# Patient Record
Sex: FEMALE | URBAN - METROPOLITAN AREA
[De-identification: names, ages, dates, MRNs, and addresses within clinical notes are randomized per-mention and may not be internally consistent; named-entity substitution may affect disease eponyms.]

---

## 2018-05-30 ENCOUNTER — TRANSFERRED RECORDS (OUTPATIENT)
Dept: HEALTH INFORMATION MANAGEMENT | Facility: CLINIC | Age: 73
End: 2018-05-30

## 2018-05-31 ENCOUNTER — TRANSFERRED RECORDS (OUTPATIENT)
Dept: HEALTH INFORMATION MANAGEMENT | Facility: CLINIC | Age: 73
End: 2018-05-31

## 2018-06-01 ENCOUNTER — TRANSFERRED RECORDS (OUTPATIENT)
Dept: HEALTH INFORMATION MANAGEMENT | Facility: CLINIC | Age: 73
End: 2018-06-01

## 2018-06-25 ENCOUNTER — TRANSFERRED RECORDS (OUTPATIENT)
Dept: HEALTH INFORMATION MANAGEMENT | Facility: CLINIC | Age: 73
End: 2018-06-25

## 2018-07-09 ASSESSMENT — ENCOUNTER SYMPTOMS
SEIZURES: 0
WEIGHT GAIN: 0
FATIGUE: 1
POLYPHAGIA: 0
HEADACHES: 0
PARALYSIS: 0
FEVER: 0
LOSS OF CONSCIOUSNESS: 0
DECREASED APPETITE: 0
TINGLING: 0
WEAKNESS: 0
DISTURBANCES IN COORDINATION: 0
HALLUCINATIONS: 0
WEIGHT LOSS: 0
DIZZINESS: 1
MEMORY LOSS: 0
NUMBNESS: 0
ALTERED TEMPERATURE REGULATION: 0
NIGHT SWEATS: 0
POLYDIPSIA: 0
CHILLS: 0
SPEECH CHANGE: 0
INCREASED ENERGY: 1
TREMORS: 0

## 2018-07-12 ENCOUNTER — OFFICE VISIT (OUTPATIENT)
Dept: NEUROSURGERY | Facility: CLINIC | Age: 73
End: 2018-07-12
Payer: MEDICARE

## 2018-07-12 VITALS
BODY MASS INDEX: 19.07 KG/M2 | WEIGHT: 107.6 LBS | HEIGHT: 63 IN | DIASTOLIC BLOOD PRESSURE: 80 MMHG | HEART RATE: 73 BPM | SYSTOLIC BLOOD PRESSURE: 136 MMHG | OXYGEN SATURATION: 100 % | TEMPERATURE: 96 F | RESPIRATION RATE: 17 BRPM

## 2018-07-12 DIAGNOSIS — G91.9 HYDROCEPHALUS, ACQUIRED (H): Primary | ICD-10-CM

## 2018-07-12 PROBLEM — R41.0 CONFUSION: Status: ACTIVE | Noted: 2018-05-31

## 2018-07-12 PROBLEM — G93.89 BRAIN MASS: Status: ACTIVE | Noted: 2018-05-31

## 2018-07-12 PROBLEM — M67.912 TENDINOPATHY OF LEFT ROTATOR CUFF: Status: ACTIVE | Noted: 2017-05-30

## 2018-07-12 PROBLEM — M19.012 PRIMARY OSTEOARTHRITIS OF LEFT SHOULDER: Status: ACTIVE | Noted: 2017-05-30

## 2018-07-12 RX ORDER — ACETAZOLAMIDE 125 MG/1
125 TABLET ORAL DAILY
COMMUNITY
Start: 2018-06-05

## 2018-07-12 RX ORDER — MULTIVIT WITH MINERALS/LUTEIN
TABLET ORAL DAILY
COMMUNITY

## 2018-07-12 ASSESSMENT — PAIN SCALES - GENERAL: PAINLEVEL: MILD PAIN (2)

## 2018-07-12 NOTE — LETTER
7/12/2018       RE: Kiana Hernandez  [address] [place] [address]     Dear Colleague,    Thank you for referring your patient, Kiana Hernandez, to the [facility]. Please see a copy of my visit note below.    See dictated note.    Again, thank you for allowing me to participate in the care of your patient.      Sincerely,    Cheri Dave MD

## 2018-07-12 NOTE — MR AVS SNAPSHOT
"              After Visit Summary   7/12/2018    Kiana Hernandez    MRN: [id number]           Patient Information     Date Of Birth          1945        Visit Information        Provider Department      7/12/2018 8:30 AM Cheri Dave MD [facility]        Care Instructions    Feel free to follow up with Dr. Dave as needed.          Follow-ups after your visit        Who to contact     Please call your clinic at [phone] to:    Ask questions about your health    Make or cancel appointments    Discuss your medicines    Learn about your test results    Speak to your doctor            Additional Information About Your Visit        MyChart Information     [employer] gives you secure access to your electronic health record. If you see a primary care provider, you can also send messages to your care team and make appointments. If you have questions, please call your primary care clinic.  If you do not have a primary care provider, please call [phone] and they will assist you.      [employer] is an electronic gateway that provides easy, online access to your medical records. With [employer], you can request a clinic appointment, read your test results, renew a prescription or communicate with your care team.     To access your existing account, please contact your [facility] or call [phone] for assistance.        Care EveryWhere ID     This is your Care EveryWhere ID. This could be used by other organizations to access your [facility] medical records  [id number]        Your Vitals Were     Pulse Temperature Respirations Height Pulse Oximetry Breastfeeding?    73 96  F (35.6  C) (Oral) 17 1.603 m (5' 3.1\") 100% No    BMI (Body Mass Index)                   19 kg/m2            Blood Pressure from Last 3 Encounters:   07/12/18 136/80    Weight from Last 3 Encounters:   07/12/18 48.8 kg (107 lb 9.6 oz)              Today, you had the following     No orders " found for display       Primary Care Provider    None Specified       No primary provider on file.        Equal Access to Services     LUKE RACHEL : Hadii alirio Mccray, alexandria byrd, gabepuja rushingmaann tanbubbaayde myers. [employer] [place] [phone].    ATENCIÓN: Si habla español, tiene a stout disposición servicios gratuitos de asistencia lingüística. Llame al [phone].    We comply with applicable federal civil rights laws and [place] laws. We do not discriminate on the basis of race, color, national origin, age, disability, sex, sexual orientation, or gender identity.            Thank you!     Thank you for choosing [facility] NEUROSURGERY  for your care. Our goal is always to provide you with excellent care. Hearing back from our patients is one way we can continue to improve our services. Please take a few minutes to complete the written survey that you may receive in the mail after your visit with us. Thank you!             Your Updated Medication List - Protect others around you: Learn how to safely use, store and throw away your medicines at www.disposemymeds.org.          This list is accurate as of 7/12/18 10:18 AM.  Always use your most recent med list.                   Brand Name Dispense Instructions for use Diagnosis    acetaZOLAMIDE 125 MG tablet    DIAMOX     Take 125 mg by mouth daily        vitamin E 1000 UNIT capsule    TOCOPHEROL     Take by mouth daily

## 2018-07-12 NOTE — LETTER
Date:August 14, 2018      Patient was self referred, no letter generated. Do not send.        [facility] Health Information

## 2018-07-12 NOTE — NURSING NOTE
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     David Mcfarlane, EMT

## 2018-07-12 NOTE — LETTER
"7/12/2018      RE: Kiana Hernandez  [address] [place] [address]       See dictated note.    Service Date: 07/12/2018         RE:  Kiana Hernandez.      Dear Dr. Jane:        We saw [facility]. 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  She is accompanied by her wife.  In summary, she is a 73-year-old right-handed woman whose history began at the end of 05/2018 when she had a sudden episode of \"confusion\" while at lunch.  She feels like she was in a dream state.  She was able to drive home.  Her wife was concerned when she began repeating herself multiple times and she was taken to  later that day.  By the time she arrived, her memory improved and she started feeling back to normal.  She was evaluated for stroke.  Workup included an MRI, MRA and MRV of the head.  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  Dr. Landis evaluated and has recommended a third ventriculostomy for management of hydrocephalus.      While she is currently feeling well, she has had some new symptoms over the past few weeks.  She feels imbalanced and has been tripping frequently.  She has fallen twice.  While she has been clumsy most of her life, she acknowledges that the recent episodes are not normal for her.  She describes occasional nausea but no vomiting.  Her appetite has declined, and she has lost 6-8 pounds.  She denies any headaches.  Her cognition appears to be a normal since the initial episode of confusion.  She also describes some fatigue and takes naps every afternoon.  A prescription for acetazolamide was given of which she has not taken the dose of 125 mg daily.      PAST MEDICAL HISTORY:  Relatively brief.  She was hospitalized for chest pain in the past.  She has had no surgeries.        FAMILY HISTORY:  Significant for several relatives of dementia.  Her sister was recently " diagnosed with ALS.  There is no known family history of intracranial tumors.      SOCIAL HISTORY:  She lives in [place].  She is .  She has 2 children.  She works part-time as a spiritual director.  She does not smoke.  On average she drinks a beer a day.      PHYSICAL EXAMINATION:  On exam, her general appearance is very good.  She appears her stated age.  She is alert and fully oriented.  Her speech and language are normal.  Extraocular movements are full.  There is no ptosis or lid lag.  Her pupils are equal and reactive to light and accommodation.  There is no nystagmus.  She moves all extremities with good strength.  Her gait is normal, but we did not test tandem gait.      REVIEW OF STUDIES:  We went over her MRIs from [place].  These show a nonenhancing mass that is a little over 1 cm in the mid-brain tectum.  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      IMPRESSION AND PLAN:  We agree with Dr. Landis's plan of the third ventriculostomy is very reasonable, and we encouraged her to undergo this operation with him.  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  No biopsy or resection is necessary and serial follow up with MRI will be sufficient.  We discussed the technical details of the third ventriculostomy and the potential that she may still need a CSF shunt if her hydrocephalus persists following the third ventriculostomy.      Currently, she is minimally symptomatic.  She has a  trip planned in a few weeks.  As long as her symptoms are minimal, she can hold off on surgery for a few more weeks.  We encouraged her to take her records with her in case she has any problems.  We encouraged her to follow up with Dr. Landis upon her return.  She can contact us if she has any further questions or if  she would like to follow up with us in the future.  Please do not hesitate to contact us with questions.         D: 2018   T: 2018   MT: MARYAM      Name:     AYLEEN CLEMENT   MRN:      [id number]        Account:      [id number]   :      1945           Service Date: 2018      Document: [id number]       Cheri Dave MD    CC:  Alysha Jane MD   [place] [phone]

## 2018-08-13 NOTE — PROGRESS NOTES
"Service Date: 07/12/2018      Alysha Jane MD   [facility]    [address] [place]  [address]      RE:  Kiana Hernandez.      Dear Dr. Jane:        We saw [facility]. 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  She is accompanied by her wife.  In summary, she is a 73-year-old right-handed woman whose history began at the end of 05/2018 when she had a sudden episode of \"confusion\" while at lunch.  She feels like she was in a dream state.  She was able to drive home.  Her wife was concerned when she began repeating herself multiple times and she was taken to [facility] later that day.  By the time she arrived, her memory improved and she started feeling back to normal.  She was evaluated for stroke.  Workup included an MRI, MRA and MRV of the head.  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  Dr. Landis evaluated and has recommended a third ventriculostomy for management of hydrocephalus.      While she is currently feeling well, she has had some new symptoms over the past few weeks.  She feels imbalanced and has been tripping frequently.  She has fallen twice.  While she has been clumsy most of her life, she acknowledges that the recent episodes are not normal for her.  She describes occasional nausea but no vomiting.  Her appetite has declined, and she has lost 6-8 pounds.  She denies any headaches.  Her cognition appears to be a normal since the initial episode of confusion.  She also describes some fatigue and takes naps every afternoon.  A prescription for acetazolamide was given of which she has not taken the dose of 125 mg daily.      PAST MEDICAL HISTORY:  Relatively brief.  She was hospitalized for chest pain in the past.  She has had no surgeries.        FAMILY HISTORY:  Significant for several relatives of dementia.  Her sister was recently diagnosed " with ALS.  There is no known family history of intracranial tumors.      SOCIAL HISTORY:  She lives in [place].  She is .  She has 2 children.  She works part-time as a spiritual director.  She does not smoke.  On average she drinks a beer a day.      PHYSICAL EXAMINATION:  On exam, her general appearance is very good.  She appears her stated age.  She is alert and fully oriented.  Her speech and language are normal.  Extraocular movements are full.  There is no ptosis or lid lag.  Her pupils are equal and reactive to light and accommodation.  There is no nystagmus.  She moves all extremities with good strength.  Her gait is normal, but we did not test tandem gait.      REVIEW OF STUDIES:  We went over her MRIs from [facility].  These show a nonenhancing mass that is a little over 1 cm in the mid-brain tectum.  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      IMPRESSION AND PLAN:  We agree with Dr. Landis's plan of the third ventriculostomy is very reasonable, and we encouraged her to undergo this operation with him.  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  No biopsy or resection is necessary and serial follow up with MRI will be sufficient.  We discussed the technical details of the third ventriculostomy and the potential that she may still need a CSF shunt if her hydrocephalus persists following the third ventriculostomy.      Currently, she is minimally symptomatic.  She has a  trip planned in a few weeks.  As long as her symptoms are minimal, she can hold off on surgery for a few more weeks.  We encouraged her to take her records with her in case she has any problems.  We encouraged her to follow up with Dr. Landis upon her return.  She can contact us if she has any further questions or if she would  like to follow up with us in the future.  Please do not hesitate to contact us with questions.         DIANELYS CORDERO MD             D: 2018   T: 2018   MT: MARYAM      Name:     AYLEEN CLEMENT   MRN:      [id number]        Account:      [id number]   :      1945           Service Date: 2018      Document: [id number]

## 2020-03-11 ENCOUNTER — HEALTH MAINTENANCE LETTER (OUTPATIENT)
Age: 75
End: 2020-03-11

## 2021-01-03 ENCOUNTER — HEALTH MAINTENANCE LETTER (OUTPATIENT)
Age: 76
End: 2021-01-03

## 2021-04-25 ENCOUNTER — HEALTH MAINTENANCE LETTER (OUTPATIENT)
Age: 76
End: 2021-04-25

## 2021-10-10 ENCOUNTER — HEALTH MAINTENANCE LETTER (OUTPATIENT)
Age: 76
End: 2021-10-10

## 2022-03-26 ENCOUNTER — HEALTH MAINTENANCE LETTER (OUTPATIENT)
Age: 77
End: 2022-03-26

## 2022-05-21 ENCOUNTER — HEALTH MAINTENANCE LETTER (OUTPATIENT)
Age: 77
End: 2022-05-21

## 2022-09-18 ENCOUNTER — HEALTH MAINTENANCE LETTER (OUTPATIENT)
Age: 77
End: 2022-09-18

## 2023-06-04 ENCOUNTER — HEALTH MAINTENANCE LETTER (OUTPATIENT)
Age: 78
End: 2023-06-04